# Patient Record
Sex: FEMALE | Race: BLACK OR AFRICAN AMERICAN | NOT HISPANIC OR LATINO | Employment: FULL TIME | URBAN - METROPOLITAN AREA
[De-identification: names, ages, dates, MRNs, and addresses within clinical notes are randomized per-mention and may not be internally consistent; named-entity substitution may affect disease eponyms.]

---

## 2023-12-23 ENCOUNTER — OFFICE VISIT (OUTPATIENT)
Dept: URGENT CARE | Facility: CLINIC | Age: 43
End: 2023-12-23
Payer: COMMERCIAL

## 2023-12-23 VITALS
TEMPERATURE: 98 F | RESPIRATION RATE: 18 BRPM | OXYGEN SATURATION: 99 % | WEIGHT: 166 LBS | DIASTOLIC BLOOD PRESSURE: 81 MMHG | HEIGHT: 64 IN | SYSTOLIC BLOOD PRESSURE: 124 MMHG | BODY MASS INDEX: 28.34 KG/M2 | HEART RATE: 100 BPM

## 2023-12-23 DIAGNOSIS — Z20.828 EXPOSURE TO THE FLU: Primary | ICD-10-CM

## 2023-12-23 DIAGNOSIS — R05.9 COUGH, UNSPECIFIED TYPE: ICD-10-CM

## 2023-12-23 LAB
CTP QC/QA: YES
MOLECULAR STREP A: NEGATIVE
POC MOLECULAR INFLUENZA A AGN: NEGATIVE
POC MOLECULAR INFLUENZA B AGN: NEGATIVE
SARS-COV-2 RDRP RESP QL NAA+PROBE: NEGATIVE

## 2023-12-23 PROCEDURE — 99202 PR OFFICE/OUTPT VISIT, NEW, LEVL II, 15-29 MIN: ICD-10-PCS | Mod: ,,, | Performed by: FAMILY MEDICINE

## 2023-12-23 PROCEDURE — 87651 POCT STREP A MOLECULAR: ICD-10-PCS | Mod: QW,,, | Performed by: FAMILY MEDICINE

## 2023-12-23 PROCEDURE — 87635 SARS-COV-2 COVID-19 AMP PRB: CPT | Mod: QW,,, | Performed by: FAMILY MEDICINE

## 2023-12-23 PROCEDURE — 87502 INFLUENZA DNA AMP PROBE: CPT | Mod: QW,,, | Performed by: FAMILY MEDICINE

## 2023-12-23 PROCEDURE — 87635: ICD-10-PCS | Mod: QW,,, | Performed by: FAMILY MEDICINE

## 2023-12-23 PROCEDURE — 87651 STREP A DNA AMP PROBE: CPT | Mod: QW,,, | Performed by: FAMILY MEDICINE

## 2023-12-23 PROCEDURE — 87502 POCT INFLUENZA A/B MOLECULAR: ICD-10-PCS | Mod: QW,,, | Performed by: FAMILY MEDICINE

## 2023-12-23 PROCEDURE — 99202 OFFICE O/P NEW SF 15 MIN: CPT | Mod: ,,, | Performed by: FAMILY MEDICINE

## 2023-12-23 RX ORDER — OSELTAMIVIR PHOSPHATE 75 MG/1
75 CAPSULE ORAL 2 TIMES DAILY
Qty: 10 CAPSULE | Refills: 0 | Status: SHIPPED | OUTPATIENT
Start: 2023-12-23 | End: 2023-12-28

## 2023-12-23 RX ORDER — TRAZODONE HYDROCHLORIDE 50 MG/1
50 TABLET ORAL NIGHTLY
COMMUNITY
Start: 2023-11-13

## 2023-12-23 RX ORDER — DEXTROAMPHETAMINE SACCHARATE, AMPHETAMINE ASPARTATE, DEXTROAMPHETAMINE SULFATE AND AMPHETAMINE SULFATE 3.75; 3.75; 3.75; 3.75 MG/1; MG/1; MG/1; MG/1
15 TABLET ORAL 2 TIMES DAILY
COMMUNITY
Start: 2023-12-11

## 2023-12-23 RX ORDER — VORTIOXETINE 20 MG/1
1 TABLET, FILM COATED ORAL
COMMUNITY
Start: 2023-12-08

## 2023-12-23 NOTE — PATIENT INSTRUCTIONS
Discussed the physical finding, condition and course.  Flu test negative, strep test negative, COVID-19 test negative  With close contact with flu positive family members discussed in detail on Tamiflu as prophylactic and treatment as well  Adequate hydration and rest.  Alternate Tylenol and ibuprofen for fever body aches and headache.  Claritin or Allegra for congestion  Mucinex DM for cough and cold.  Call or return to clinic for any questions.  Patient voiced understanding, agrees with the plan of care

## 2023-12-23 NOTE — PROGRESS NOTES
"Subjective:      Patient ID: Veronica Shaw is a 43 y.o. female.    Vitals:  height is 5' 4" (1.626 m) and weight is 75.3 kg (166 lb). Her temperature is 98.1 °F (36.7 °C). Her blood pressure is 124/81 and her pulse is 100. Her respiration is 18 and oxygen saturation is 99%.     Chief Complaint: Cough (Coughing, chills, HA started 2 days )    HPI:  43-year-old female present to clinic with concerns of feverish, body aches, chills and headache, congestion and coughing since 2 days.  Reviewed the vital signs appears stable, no fever in the clinic.  Daughter tested positive for flu today and states son tested for flu few days ago    ROS :  Constitutional : _ feeling feverish, chills, congestion and headache  Eyes : _No redness, drainage or pain  HENT_ no sore throat, no difficulty swallowing  Respiratory_no wheezing, no shortness of breath  Cardiovascular_no chest pain  Gastrointestinal_No vomiting, No diarrhea, No abdominal pain  Musculoskeletal_no joint pain, no joint swelling  Integumentary_no skin rash     Objective:     Physical Exam  General : Alert and Oriented, No apparent distress, afebrile to touch, sounds stuffy and congested  Neck - supple  HENT : Oropharynx no redness or swelling. Tonsils 3+ bilateral no exudate, bilateral TMs intact mild fluid no redness.   Respiratory : Bilateral equal breath sounds, nonlabored respirations  Cardiovascular : Rate, rhythm regular, normal volume pulse, no murmur  Integumentary : Warm, Dry and no rash    Assessment:     1. Exposure to the flu    2. Cough, unspecified type      Plan:   Discussed the physical finding, condition and course.  Flu test negative, strep test negative, COVID-19 test negative  With close contact with flu positive family members discussed in detail on Tamiflu as prophylactic and treatment as well  Adequate hydration and rest.  Alternate Tylenol and ibuprofen for fever body aches and headache.  Claritin or Allegra for congestion  Mucinex DM for " cough and cold.  Call or return to clinic for any questions.  Patient voiced understanding, agrees with the plan of care    Exposure to the flu  -     oseltamivir (TAMIFLU) 75 MG capsule; Take 1 capsule (75 mg total) by mouth 2 (two) times daily. for 5 days  Dispense: 10 capsule; Refill: 0    Cough, unspecified type  -     POCT Influenza A/B MOLECULAR  -     POCT Strep A, Molecular  -     POCT COVID-19 Rapid Screening

## 2024-06-07 ENCOUNTER — OFFICE VISIT (OUTPATIENT)
Dept: URGENT CARE | Facility: CLINIC | Age: 44
End: 2024-06-07
Payer: COMMERCIAL

## 2024-06-07 VITALS
DIASTOLIC BLOOD PRESSURE: 84 MMHG | WEIGHT: 166 LBS | BODY MASS INDEX: 28.34 KG/M2 | SYSTOLIC BLOOD PRESSURE: 121 MMHG | HEART RATE: 81 BPM | TEMPERATURE: 98 F | OXYGEN SATURATION: 97 % | HEIGHT: 64 IN | RESPIRATION RATE: 18 BRPM

## 2024-06-07 DIAGNOSIS — J02.9 SORE THROAT: ICD-10-CM

## 2024-06-07 DIAGNOSIS — H66.92 ACUTE LEFT OTITIS MEDIA: Primary | ICD-10-CM

## 2024-06-07 PROCEDURE — 99213 OFFICE O/P EST LOW 20 MIN: CPT | Mod: ,,, | Performed by: FAMILY MEDICINE

## 2024-06-07 PROCEDURE — 87651 STREP A DNA AMP PROBE: CPT | Mod: QW,,, | Performed by: FAMILY MEDICINE

## 2024-06-07 PROCEDURE — 87502 INFLUENZA DNA AMP PROBE: CPT | Mod: QW,,, | Performed by: FAMILY MEDICINE

## 2024-06-07 PROCEDURE — 87635 SARS-COV-2 COVID-19 AMP PRB: CPT | Mod: QW,,, | Performed by: FAMILY MEDICINE

## 2024-06-07 RX ORDER — CEFDINIR 300 MG/1
300 CAPSULE ORAL 2 TIMES DAILY
Qty: 14 CAPSULE | Refills: 0 | Status: SHIPPED | OUTPATIENT
Start: 2024-06-07 | End: 2024-06-14

## 2024-06-07 RX ORDER — NORETHINDRONE AND ETHINYL ESTRADIOL 7 DAYS X 3
1 KIT ORAL
COMMUNITY
Start: 2024-05-27

## 2024-06-07 RX ORDER — PREDNISONE 20 MG/1
TABLET ORAL
Qty: 8 TABLET | Refills: 0 | Status: SHIPPED | OUTPATIENT
Start: 2024-06-07

## 2024-06-07 NOTE — PATIENT INSTRUCTIONS
Discussed the physical findings, clinical diagnosis, condition and course.  Adequate hydration  Antihistamine of choice over-the-counter like Claritin Zyrtec or Allegra along with Flonase for 2 weeks  Continue Tylenol or ibuprofen for pain and discomfort  Start antibiotics today  Prednisone as anti inflammation for symptom relief, risk and benefits discussed voiced understanding  Warm saltwater gargles for sore throat.  Call this clinic for any questions    Strep test negative, COVID-19 test negative, flu test negative  Work excuse for 2 days

## 2024-06-07 NOTE — PROGRESS NOTES
"Subjective:      Patient ID: Veronica Shaw is a 43 y.o. female.    Vitals:  height is 5' 4" (1.626 m) and weight is 75.3 kg (166 lb). Her temperature is 98.1 °F (36.7 °C). Her blood pressure is 121/84 and her pulse is 81. Her respiration is 18 and oxygen saturation is 97%.     Chief Complaint: Fatigue     Patient is a 43 y.o. female who presents to urgent care with complaints of fatigue, ST, congestion, ear pain on both sides, sinus drip, HA x4 days. Alleviating factors include sinus medication with no relief. Patient denies body aches.      ROS :  Constitutional : _ no measured fever, positive for feeling fatigued, headache, no body aches or chills  Eyes : _No redness, drainage or pain  HENT_sore throat, postnasal drainage  Respiratory_no wheezing, no shortness of breath  Cardiovascular_no chest pain  Gastrointestinal_ No vomiting, No diarrhea, No abdominal pain  Musculoskeletal_no joint pain, no joint swelling  Integumentary_no skin rash     43-year-old female present to clinic with 4 days' history of sore throat, congestion, postnasal drip, bilateral ear pressure and feeling fatigued.  No measured fever at home.  Reviewed the vital signs appears stable.  No concerns of positive exposure to infections.  Requesting for all swabs today  Objective:     Physical Exam  General : Alert and Oriented, No apparent distress, afebrile, sounds stuffy and congested  Neck - supple  HENT : Oropharynx no redness or swelling. Tonsils not enlarged, bilateral TM intact, right TM no redness, excessive cerumen present.  Left TM appears dull erythematous and moderate fluid  Respiratory : Bilateral equal breath sounds, nonlabored respirations  Cardiovascular : Rate, rhythm regular, normal volume pulse, no murmur  Gastrointestinal: Full abdomen, soft, nontender to palpate  Integumentary : Warm, Dry and no rash    Assessment:     1. Acute left otitis media    2. Sore throat      Plan:   Discussed the physical findings, clinical " diagnosis, condition and course.  Adequate hydration  Antihistamine of choice over-the-counter like Claritin Zyrtec or Allegra along with Flonase for 2 weeks  Continue Tylenol or ibuprofen for pain and discomfort  Start antibiotics today  Prednisone as anti inflammation for symptom relief, risk and benefits discussed voiced understanding  Warm saltwater gargles for sore throat.  Call this clinic for any questions    Strep test negative, COVID-19 test negative, flu test negative  Work excuse for 2 days    Acute left otitis media  -     cefdinir (OMNICEF) 300 MG capsule; Take 1 capsule (300 mg total) by mouth 2 (two) times daily. for 7 days  Dispense: 14 capsule; Refill: 0  -     predniSONE (DELTASONE) 20 MG tablet; One tablet orally twice daily for 3 days and then once daily for 2 days  Dispense: 8 tablet; Refill: 0    Sore throat  -     POCT COVID-19 Rapid Screening  -     POCT Influenza A/B Molecular  -     POCT Strep A, Molecular

## 2024-06-07 NOTE — LETTER
June 7, 2024      [facility] [place] [address] [facility] [place] [address]  Phone: [phone]       Patient: Veronica Shaw   YOB: 1980  Date of Visit: 06/07/2024    To Whom It May Concern:    Larry Shaw  was at [facility] on 06/07/2024. The patient may return to work/school on 06/09/24 with no restrictions. If you have any questions or concerns, or if I can be of further assistance, please do not hesitate to contact me.    Sincerely,    Jennifer Thompson MA

## 2024-12-05 ENCOUNTER — OFFICE VISIT (OUTPATIENT)
Dept: URGENT CARE | Facility: CLINIC | Age: 44
End: 2024-12-05
Payer: COMMERCIAL

## 2024-12-05 VITALS
OXYGEN SATURATION: 98 % | WEIGHT: 191 LBS | TEMPERATURE: 98 F | SYSTOLIC BLOOD PRESSURE: 120 MMHG | RESPIRATION RATE: 16 BRPM | HEIGHT: 64 IN | DIASTOLIC BLOOD PRESSURE: 80 MMHG | BODY MASS INDEX: 32.61 KG/M2 | HEART RATE: 95 BPM

## 2024-12-05 DIAGNOSIS — M54.12 CERVICAL RADICULOPATHY: ICD-10-CM

## 2024-12-05 DIAGNOSIS — M62.838 MUSCLE SPASM: Primary | ICD-10-CM

## 2024-12-05 DIAGNOSIS — M54.2 CERVICAL PAIN (NECK): ICD-10-CM

## 2024-12-05 PROCEDURE — 99213 OFFICE O/P EST LOW 20 MIN: CPT | Mod: ,,, | Performed by: FAMILY MEDICINE

## 2024-12-05 RX ORDER — IBUPROFEN 800 MG/1
800 TABLET ORAL EVERY 6 HOURS PRN
Qty: 12 TABLET | Refills: 0 | Status: SHIPPED | OUTPATIENT
Start: 2024-12-05

## 2024-12-05 RX ORDER — HYDROCODONE BITARTRATE AND ACETAMINOPHEN 5; 325 MG/1; MG/1
1 TABLET ORAL EVERY 8 HOURS
COMMUNITY
Start: 2024-10-25 | End: 2024-12-05

## 2024-12-05 RX ORDER — CYCLOBENZAPRINE HCL 10 MG
10 TABLET ORAL 3 TIMES DAILY PRN
Qty: 30 TABLET | Refills: 0 | Status: SHIPPED | OUTPATIENT
Start: 2024-12-05 | End: 2024-12-15
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
## 2024-12-05 NOTE — PATIENT INSTRUCTIONS
"Radiculopathy   The Basics   Written by the doctors and editors at [facility]   What is radiculopathy? -- "Radiculopathy" is the medical term for the pain, weakness, numbness, or tingling that happens when nerves coming from the spinal cord get pinched or damaged. Radiculopathy can affect different parts of the body, depending on which nerve or group of nerves is affected. People sometimes refer to radiculopathy as having a "pinched nerve."  Here are 2 common examples of radiculopathy:  Cervical radiculopathy - People with this type of radiculopathy have pain, weakness, numbness, or tingling down one or both arms. The condition happens when one or more of the nerves that go from the spine to the arm get pinched or damaged.  Lumbosacral radiculopathy - People with this type of radiculopathy have pain, weakness, numbness, or tingling in the buttocks or down the leg. The condition happens when one or more of the nerves that go from the spine to the foot and leg get pinched or damaged. People sometimes refer to the symptoms of this type of radiculopathy as "sciatica."  What causes radiculopathy? -- Radiculopathy is usually caused by a problem with the back. To understand radiculopathy, it's helpful to first learn a little about the back and spine.  The back is made up of (figure 1):  Vertebrae - A stack of bones that sit on top of one another like a stack of coins. Each of these bones has a hole in the center. When stacked, the bones form a hollow tube that protects the spinal cord.  Spinal cord and nerves - The spinal cord is the highway of nerves that connects the brain to the rest of the body. It runs through the hole in the center of the vertebrae. Nerves branch out from the spinal cord and pass in between the vertebrae. From there they connect to the arms, the legs, and the organs. (This is why problems in the back can cause leg pain or bladder problems.)  Discs - Rubbery discs sit in between each of the vertebrae to " "add cushion and allow movement. The discs have a tough outer shell and jelly-like center.  Muscles, tendons, and ligaments - Together the muscles, tendons, and ligaments are called the "soft tissues" of the back. These soft tissues support the back and help hold it together.  Radiculopathy can happen when changes in the back cause a nerve to get pinched or damaged. This can happen if:  The vertebrae form bumps called bone spurs, which press on nearby nerves. (People with a condition called "spinal stenosis" often have this problem.)  The discs between the vertebrae break open and bulge out, causing them to press on or irritate nearby nerves. (A disc that breaks open and bulges is called a "herniated disc.")   Other medical conditions, such as diabetes, infection, inflammation, or a tumor injure the nerves near the spinal cord.  Should I see a doctor or nurse? -- If you have new pain, weakness, numbness, or tingling that seems to spread out to your arms or legs from your spine, see your doctor or nurse.  Will I need tests? -- Maybe. Doctors can tell a lot about a person's radiculopathy based on which parts of the body are affected and how. Because of that, you might not need any tests, especially if you have had symptoms only for a short time. Still, if your doctor is concerned about nerve damage, they might order one or more of these tests:  Imaging tests - Imaging tests, such as X-rays, MRIs, or CT scans, create pictures of the inside of the body. These imaging tests can show problems with the back like those described above.  Electromyography (also called "EMG" or nerve conduction study) - During this test, a technician or doctor checks how well electrical pulses travel across nerves to the part of your body that has symptoms. The test helps show whether the nerves controlling that body part are working right.  How is radiculopathy treated? -- Many people with radiculopathy do not need formal treatment. In some " cases, the radiculopathy goes away as the back and nerves heal. In other cases, people find ways to cope with their symptoms.  When people do get treatment, the treatments can include:  Pain medicines that you can get without a prescription (If these do not work, stronger prescription pain medicines are available.)  Medicines to relax the muscles (called muscle relaxants)  Avoiding activities that make the pain worse  Injections of medicines that numb the back or reduce swelling  Physical therapy to learn special exercises and stretches  Surgery to repair the problem causing symptoms   All topics are updated as new evidence becomes available and our peer review process is complete.  This topic retrieved from [employer] on: Sep 21, 2021.  Topic 55480 Version 7.0  Release: 29.4.2 - C29.263  © 2021 UpToDate, Inc. and/or its affiliates. All rights reserved.  figure 1: Anatomy of the back     Low back pain can be caused by problems with the muscles, ligaments, discs, bones (vertebrae), or nerves. Often, back pain is caused by strains or sprains involving the muscles or ligaments. These problems cannot always be seen on imaging tests, such as MRI or CT scans.  Graphic 28534 Version 5.0     Consumer Information Use and Disclaimer   This information is not specific medical advice and does not replace information you receive from your health care provider. This is only a brief summary of general information. It does NOT include all information about conditions, illnesses, injuries, tests, procedures, treatments, therapies, discharge instructions or life-style choices that may apply to you. You must talk with your health care provider for complete information about your health and treatment options. This information should not be used to decide whether or not to accept your health care provider's advice, instructions or recommendations. Only your health care provider has the knowledge and training to provide advice that is  right for you. The use of this information is governed by the [employer] End User License Agreement, available at https://www.[employer].Hoodin/en/solutions/[employer]/about/clive.The use of [employer] content is governed by the [employer] Terms of Use. ©2021 UpToDate, Inc. All rights reserved.  Copyright   © 2021 UpToDate, Inc. and/or its affiliates. All rights reserved.

## 2025-05-04 ENCOUNTER — OFFICE VISIT (OUTPATIENT)
Dept: URGENT CARE | Facility: CLINIC | Age: 45
End: 2025-05-04
Payer: COMMERCIAL

## 2025-05-04 VITALS
OXYGEN SATURATION: 100 % | BODY MASS INDEX: 32.61 KG/M2 | RESPIRATION RATE: 16 BRPM | HEIGHT: 64 IN | TEMPERATURE: 99 F | DIASTOLIC BLOOD PRESSURE: 80 MMHG | SYSTOLIC BLOOD PRESSURE: 126 MMHG | HEART RATE: 76 BPM | WEIGHT: 191 LBS

## 2025-05-04 DIAGNOSIS — M25.521 RIGHT ELBOW PAIN: Primary | ICD-10-CM

## 2025-05-04 PROCEDURE — 99213 OFFICE O/P EST LOW 20 MIN: CPT | Mod: ,,, | Performed by: NURSE PRACTITIONER

## 2025-05-04 RX ORDER — DICLOFENAC SODIUM 75 MG/1
75 TABLET, DELAYED RELEASE ORAL 2 TIMES DAILY
Qty: 28 TABLET | Refills: 0 | Status: SHIPPED | OUTPATIENT
Start: 2025-05-04 | End: 2025-05-18

## 2025-05-04 NOTE — PROGRESS NOTES
"Subjective:      Patient ID: Veronica Shaw is a 44 y.o. female.    Vitals:  height is 5' 4" (1.626 m) and weight is 86.6 kg (191 lb). Her temperature is 98.9 °F (37.2 °C). Her blood pressure is 126/80 and her pulse is 76. Her respiration is 16 and oxygen saturation is 100%.     Chief Complaint: arm pain (elbow)    Patient is a 44 y.o. female who presents to urgent care with complaints of R elbow pain x 6 week duration. Deep, stiff, sharp pains. Alleviating factors include ice compresses, ibuprofen with mild and transient relief. Attempted arm brace--unable to tolerate. Exacerbated w/lifting, turning. Radiating pain from elbow, into bicep head. Confirms active lifestyle (exercising).  Patient denies numbness, tingling, loss of  strength--however, uncomfortable to make fist. Max pain: 10/10 current pain: 6.5/10      ROS   Objective:     Physical Exam    Assessment:     1. Right elbow pain        Plan:       Right elbow pain  -     Ambulatory referral/consult to Orthopedics    Other orders  -     diclofenac (VOLTAREN) 75 MG EC tablet; Take 1 tablet (75 mg total) by mouth 2 (two) times daily. for 14 days  Dispense: 28 tablet; Refill: 0                    "